# Patient Record
Sex: MALE | Race: WHITE | NOT HISPANIC OR LATINO
[De-identification: names, ages, dates, MRNs, and addresses within clinical notes are randomized per-mention and may not be internally consistent; named-entity substitution may affect disease eponyms.]

---

## 2020-12-22 ENCOUNTER — APPOINTMENT (OUTPATIENT)
Dept: NEUROSURGERY | Facility: CLINIC | Age: 70
End: 2020-12-22

## 2021-01-12 ENCOUNTER — APPOINTMENT (OUTPATIENT)
Dept: NEUROSURGERY | Facility: CLINIC | Age: 71
End: 2021-01-12
Payer: MEDICARE

## 2021-01-12 VITALS
SYSTOLIC BLOOD PRESSURE: 117 MMHG | WEIGHT: 135 LBS | RESPIRATION RATE: 15 BRPM | HEIGHT: 69 IN | OXYGEN SATURATION: 99 % | BODY MASS INDEX: 19.99 KG/M2 | DIASTOLIC BLOOD PRESSURE: 73 MMHG | HEART RATE: 59 BPM

## 2021-01-12 VITALS — TEMPERATURE: 97.2 F

## 2021-01-12 DIAGNOSIS — Z78.9 OTHER SPECIFIED HEALTH STATUS: ICD-10-CM

## 2021-01-12 PROCEDURE — 99072 ADDL SUPL MATRL&STAF TM PHE: CPT

## 2021-01-12 PROCEDURE — 99203 OFFICE O/P NEW LOW 30 MIN: CPT

## 2021-01-14 PROBLEM — Z78.9 CONSUMES ALCOHOL OCCASIONALLY: Status: ACTIVE | Noted: 2021-01-12

## 2021-01-14 RX ORDER — CHROMIUM 200 MCG
TABLET ORAL
Refills: 0 | Status: ACTIVE | COMMUNITY

## 2021-01-18 NOTE — REASON FOR VISIT
[Consultation] : a consultation visit [Referred By: _________] : Patient was referred by MARIELLE [FreeTextEntry1] : low back pain

## 2021-01-18 NOTE — REVIEW OF SYSTEMS
[As Noted in HPI] : as noted in HPI [Negative] : Endocrine [Abdominal Pain] : no abdominal pain [Vomiting] : no vomiting [Skin Lesions] : no skin lesions [Diarrhea] : no diarrhea [Easy Bleeding] : no tendency for easy bleeding [Easy Bruising] : no tendency for easy bruising [FreeTextEntry9] : denies at this time

## 2021-01-18 NOTE — PHYSICAL EXAM
[General Appearance - Alert] : alert [General Appearance - In No Acute Distress] : in no acute distress [Oriented To Time, Place, And Person] : oriented to person, place, and time [Impaired Insight] : insight and judgment were intact [Cranial Nerves Optic (II)] : visual acuity intact bilaterally,  pupils equal round and reactive to light [Cranial Nerves Oculomotor (III)] : extraocular motion intact [Cranial Nerves Trigeminal (V)] : facial sensation intact symmetrically [Cranial Nerves Facial (VII)] : face symmetrical [Cranial Nerves Vestibulocochlear (VIII)] : hearing was intact bilaterally [Normal] : normal [Able to toe walk] : the patient was able to toe walk [Able to heel walk] : the patient was able to heel walk [Sclera] : the sclera and conjunctiva were normal [PERRL With Normal Accommodation] : pupils were equal in size, round, reactive to light, with normal accommodation [Hearing Threshold Finger Rub Not [place]] : hearing was normal [Neck Appearance] : the appearance of the neck was normal [] : no respiratory distress [Edema] : there was no peripheral edema [Respiration, Rhythm And Depth] : normal respiratory rhythm and effort [Abnormal Walk] : normal gait [Involuntary Movements] : no involuntary movements were seen [Skin Color & Pigmentation] : normal skin color and pigmentation [Motor Tone] : muscle strength and tone were normal [Romberg's Sign] : Romberg's sign was negtive [Limited Balance] : balance was intact [Tremor] : no tremor present

## 2021-01-18 NOTE — HISTORY OF PRESENT ILLNESS
[> 3 months] : more  than 3 months [FreeTextEntry1] : low back pain [de-identified] : 71 yo male with PMH of B cell lymphoma, gastric Ca, Prostate Ca, who arrives for an initial consultation for low back pain and RLE pain with onset of 10/10/2020.  He was found to have a large L5/S1 herniated disc on MRI sent by Dr. Mejia, and has tried pain management without successful reduction of pain.  He reports that he does not have any pain over the last 2 weeks.  He reports having ESCI in November with no relief at that time.   \par \par Pain:  \par Low back - denies back pain (mostly RLE pain)\par RLE from hip to toes\par Pain rated 10/10\par Aggravated with prolonged sitting, standing, walking\par Pain management:  Dr. Davis - has included 2 REMINGTON, and PT with no relief\par Pain medications:  currently OTC anti inflammatory

## 2021-01-18 NOTE — ASSESSMENT
[FreeTextEntry1] : 71 yo male referred by pain management for L5 radiculopathy on MRI, and refractory to injections and PT which has since resolved over the last 2 weeks.  He will continue pain management and follow up as needed if worsens again.\par \par 1)  f/u PRN

## 2022-03-08 ENCOUNTER — APPOINTMENT (OUTPATIENT)
Dept: NEUROSURGERY | Facility: CLINIC | Age: 72
End: 2022-03-08
Payer: MEDICARE

## 2022-03-08 VITALS
SYSTOLIC BLOOD PRESSURE: 134 MMHG | WEIGHT: 135 LBS | HEIGHT: 69 IN | BODY MASS INDEX: 19.99 KG/M2 | DIASTOLIC BLOOD PRESSURE: 84 MMHG | TEMPERATURE: 98 F | HEART RATE: 59 BPM | OXYGEN SATURATION: 97 %

## 2022-03-08 DIAGNOSIS — Z87.891 PERSONAL HISTORY OF NICOTINE DEPENDENCE: ICD-10-CM

## 2022-03-08 PROCEDURE — 99214 OFFICE O/P EST MOD 30 MIN: CPT

## 2022-03-10 PROBLEM — Z87.891 FORMER SMOKER: Status: ACTIVE | Noted: 2021-01-12

## 2022-03-14 ENCOUNTER — OUTPATIENT (OUTPATIENT)
Dept: OUTPATIENT SERVICES | Facility: HOSPITAL | Age: 72
LOS: 1 days | End: 2022-03-14
Payer: MEDICARE

## 2022-03-14 VITALS
HEART RATE: 56 BPM | SYSTOLIC BLOOD PRESSURE: 130 MMHG | HEIGHT: 69 IN | DIASTOLIC BLOOD PRESSURE: 75 MMHG | OXYGEN SATURATION: 98 % | WEIGHT: 134.92 LBS | RESPIRATION RATE: 18 BRPM | TEMPERATURE: 96 F

## 2022-03-14 DIAGNOSIS — Z29.9 ENCOUNTER FOR PROPHYLACTIC MEASURES, UNSPECIFIED: ICD-10-CM

## 2022-03-14 DIAGNOSIS — Z90.49 ACQUIRED ABSENCE OF OTHER SPECIFIED PARTS OF DIGESTIVE TRACT: Chronic | ICD-10-CM

## 2022-03-14 DIAGNOSIS — Z98.890 OTHER SPECIFIED POSTPROCEDURAL STATES: Chronic | ICD-10-CM

## 2022-03-14 DIAGNOSIS — Z90.3 ACQUIRED ABSENCE OF STOMACH [PART OF]: Chronic | ICD-10-CM

## 2022-03-14 DIAGNOSIS — Z92.3 PERSONAL HISTORY OF IRRADIATION: Chronic | ICD-10-CM

## 2022-03-14 DIAGNOSIS — Z11.52 ENCOUNTER FOR SCREENING FOR COVID-19: ICD-10-CM

## 2022-03-14 DIAGNOSIS — M51.26 OTHER INTERVERTEBRAL DISC DISPLACEMENT, LUMBAR REGION: ICD-10-CM

## 2022-03-14 LAB
A1C WITH ESTIMATED AVERAGE GLUCOSE RESULT: 5.7 % — HIGH (ref 4–5.6)
ANION GAP SERPL CALC-SCNC: 11 MMOL/L — SIGNIFICANT CHANGE UP (ref 5–17)
BUN SERPL-MCNC: 16 MG/DL — SIGNIFICANT CHANGE UP (ref 7–23)
CALCIUM SERPL-MCNC: 9.7 MG/DL — SIGNIFICANT CHANGE UP (ref 8.4–10.5)
CHLORIDE SERPL-SCNC: 102 MMOL/L — SIGNIFICANT CHANGE UP (ref 96–108)
CO2 SERPL-SCNC: 25 MMOL/L — SIGNIFICANT CHANGE UP (ref 22–31)
CREAT SERPL-MCNC: 0.91 MG/DL — SIGNIFICANT CHANGE UP (ref 0.5–1.3)
EGFR: 90 ML/MIN/1.73M2 — SIGNIFICANT CHANGE UP
ESTIMATED AVERAGE GLUCOSE: 117 MG/DL — HIGH (ref 68–114)
GLUCOSE SERPL-MCNC: 80 MG/DL — SIGNIFICANT CHANGE UP (ref 70–99)
HCT VFR BLD CALC: 40.7 % — SIGNIFICANT CHANGE UP (ref 39–50)
HGB BLD-MCNC: 12.4 G/DL — LOW (ref 13–17)
MCHC RBC-ENTMCNC: 29 PG — SIGNIFICANT CHANGE UP (ref 27–34)
MCHC RBC-ENTMCNC: 30.5 GM/DL — LOW (ref 32–36)
MCV RBC AUTO: 95.1 FL — SIGNIFICANT CHANGE UP (ref 80–100)
NRBC # BLD: 0 /100 WBCS — SIGNIFICANT CHANGE UP (ref 0–0)
PLATELET # BLD AUTO: 247 K/UL — SIGNIFICANT CHANGE UP (ref 150–400)
POTASSIUM SERPL-MCNC: 4.1 MMOL/L — SIGNIFICANT CHANGE UP (ref 3.5–5.3)
POTASSIUM SERPL-SCNC: 4.1 MMOL/L — SIGNIFICANT CHANGE UP (ref 3.5–5.3)
RBC # BLD: 4.28 M/UL — SIGNIFICANT CHANGE UP (ref 4.2–5.8)
RBC # FLD: 13.1 % — SIGNIFICANT CHANGE UP (ref 10.3–14.5)
SARS-COV-2 RNA SPEC QL NAA+PROBE: SIGNIFICANT CHANGE UP
SODIUM SERPL-SCNC: 138 MMOL/L — SIGNIFICANT CHANGE UP (ref 135–145)
WBC # BLD: 17.21 K/UL — HIGH (ref 3.8–10.5)
WBC # FLD AUTO: 17.21 K/UL — HIGH (ref 3.8–10.5)

## 2022-03-14 PROCEDURE — U0003: CPT

## 2022-03-14 PROCEDURE — 87640 STAPH A DNA AMP PROBE: CPT

## 2022-03-14 PROCEDURE — 83036 HEMOGLOBIN GLYCOSYLATED A1C: CPT

## 2022-03-14 PROCEDURE — C9803: CPT

## 2022-03-14 PROCEDURE — U0005: CPT

## 2022-03-14 PROCEDURE — G0463: CPT

## 2022-03-14 PROCEDURE — 85027 COMPLETE CBC AUTOMATED: CPT

## 2022-03-14 PROCEDURE — 36415 COLL VENOUS BLD VENIPUNCTURE: CPT

## 2022-03-14 PROCEDURE — 87641 MR-STAPH DNA AMP PROBE: CPT

## 2022-03-14 PROCEDURE — 80048 BASIC METABOLIC PNL TOTAL CA: CPT

## 2022-03-14 NOTE — H&P PST ADULT - NSICDXPASTSURGICALHX_GEN_ALL_CORE_FT
PAST SURGICAL HISTORY:  H/O resection of stomach ~1999, [facility]    History of appendectomy     History of bilateral inguinal hernia repair     History of bronchoscopy     History of radiation therapy prostate cancer ~2007

## 2022-03-14 NOTE — H&P PST ADULT - NSICDXPASTMEDICALHX_GEN_ALL_CORE_FT
PAST MEDICAL HISTORY:  2019 novel coronavirus disease (COVID-19)     BPH with elevated PSA     H/O abscess of lung ~2012 Hospitalized x 3 weeks, ~2013 Hospitalized x 2 weeks - never intubated; bronchoscopy and antibiotics    Prediabetes     Prostate cancer     Stomach cancer      PAST MEDICAL HISTORY:  2019 novel coronavirus disease (COVID-19)     BPH with elevated PSA     H/O abscess of lung ~2012 Hospitalized x 3 weeks, ~2013 Hospitalized x 2 weeks - never intubated; bronchoscopy and antibiotics    Lumbar herniated disc     Prediabetes     Prostate cancer     Stomach cancer

## 2022-03-14 NOTE — H&P PST ADULT - ASSESSMENT
CAPRINI SCORE [CLOT]    AGE RELATED RISK FACTORS                                                       MOBILITY RELATED FACTORS  [ ] Age 41-60 years                                            (1 Point)                  [ ] Bed rest                                                        (1 Point)  [X ] Age: 61-74 years                                           (2 Points)                 [ ] Plaster cast                                                   (2 Points)  [ ] Age= 75 years                                              (3 Points)                 [ ] Bed bound for more than 72 hours                 (2 Points)    DISEASE RELATED RISK FACTORS                                               GENDER SPECIFIC FACTORS  [ ] Edema in the lower extremities                       (1 Point)                  [ ] Pregnancy                                                     (1 Point)  [ ] Varicose veins                                               (1 Point)                  [ ] Post-partum < 6 weeks                                   (1 Point)             [ ] BMI > 25 Kg/m2                                            (1 Point)                  [ ] Hormonal therapy  or oral contraception          (1 Point)                 [ ] Sepsis (in the previous month)                        (1 Point)                  [ ] History of pregnancy complications                 (1 point)  [ ] Pneumonia or serious lung disease                                               [ ] Unexplained or recurrent                     (1 Point)           (in the previous month)                               (1 Point)  [ ] Abnormal pulmonary function test                     (1 Point)                 SURGERY RELATED RISK FACTORS  [ ] Acute myocardial infarction                              (1 Point)                 [ ]  Section                                             (1 Point)  [ ] Congestive heart failure (in the previous month)  (1 Point)               [ ] Minor surgery                                                  (1 Point)   [ ] Inflammatory bowel disease                             (1 Point)                 [ ] Arthroscopic surgery                                        (2 Points)  [ ] Central venous access                                      (2 Points)                [ X] General surgery lasting more than 45 minutes   (2 Points)       [ ] Stroke (in the previous month)                          (5 Points)               [ ] Elective arthroplasty                                         (5 Points)                                                                                                                                               HEMATOLOGY RELATED FACTORS                                                 TRAUMA RELATED RISK FACTORS  [ ] Prior episodes of VTE                                     (3 Points)                [ ] Fracture of the hip, pelvis, or leg                       (5 Points)  [ ] Positive family history for VTE                         (3 Points)                 [ ] Acute spinal cord injury (in the previous month)  (5 Points)  [ ] Prothrombin 53534 A                                     (3 Points)                 [ ] Paralysis  (less than 1 month)                             (5 Points)  [ ] Factor V Leiden                                             (3 Points)                  [ ] Multiple Trauma within 1 month                        (5 Points)  [ ] Lupus anticoagulants                                     (3 Points)                                                           [ ] Anticardiolipin antibodies                               (3 Points)                                                       [ ] High homocysteine in the blood                      (3 Points)                                             [ ] Other congenital or acquired thrombophilia      (3 Points)                                                [ ] Heparin induced thrombocytopenia                  (3 Points)                                          Total Score [          ]    Caprini Score 0 - 2:  Low Risk, No VTE Prophylaxis required for most patients, encourage ambulation  Caprini Score 3 - 6:  At Risk, pharmacologic VTE prophylaxis is indicated for most patients (in the absence of a contraindication)  Caprini Score Greater than or = 7:  High Risk, pharmacologic VTE prophylaxis is indicated for most patients (in the absence of a contraindication)

## 2022-03-14 NOTE — H&P PST ADULT - NSANTHOSAYNRD_GEN_A_CORE
No. LATONIA screening performed.  STOP BANG Legend: 0-2 = LOW Risk; 3-4 = INTERMEDIATE Risk; 5-8 = HIGH Risk

## 2022-03-14 NOTE — H&P PST ADULT - PROBLEM SELECTOR PLAN 1
MIS Right L5-S1 Partial Hemilaminectomy and Discectomy on 3/18/22 with Dr. Xavier Dinh at [facility].  Pre-op instructions given. Questions answered.  Covid19 PCR to be performed at [facility] today.  Medical evaluation prior to surgery.

## 2022-03-15 ENCOUNTER — APPOINTMENT (OUTPATIENT)
Dept: MRI IMAGING | Facility: CLINIC | Age: 72
End: 2022-03-15
Payer: MEDICARE

## 2022-03-15 LAB
MRSA PCR RESULT.: SIGNIFICANT CHANGE UP
S AUREUS DNA NOSE QL NAA+PROBE: SIGNIFICANT CHANGE UP

## 2022-03-15 PROCEDURE — 72148 MRI LUMBAR SPINE W/O DYE: CPT

## 2022-03-16 ENCOUNTER — APPOINTMENT (OUTPATIENT)
Dept: UROLOGY | Facility: CLINIC | Age: 72
End: 2022-03-16

## 2022-03-17 ENCOUNTER — TRANSCRIPTION ENCOUNTER (OUTPATIENT)
Age: 72
End: 2022-03-17

## 2022-03-17 VITALS
HEART RATE: 60 BPM | DIASTOLIC BLOOD PRESSURE: 81 MMHG | SYSTOLIC BLOOD PRESSURE: 131 MMHG | HEIGHT: 69 IN | TEMPERATURE: 98 F | WEIGHT: 130.07 LBS | OXYGEN SATURATION: 100 % | RESPIRATION RATE: 18 BRPM

## 2022-03-17 RX ORDER — INFLUENZA VIRUS VACCINE 15; 15; 15; 15 UG/.5ML; UG/.5ML; UG/.5ML; UG/.5ML
0.7 SUSPENSION INTRAMUSCULAR ONCE
Refills: 0 | Status: DISCONTINUED | OUTPATIENT
Start: 2022-03-18 | End: 2022-03-18

## 2022-03-17 RX ORDER — ACETAMINOPHEN 500 MG
2 TABLET ORAL
Qty: 0 | Refills: 0 | DISCHARGE

## 2022-03-18 ENCOUNTER — RESULT REVIEW (OUTPATIENT)
Age: 72
End: 2022-03-18

## 2022-03-18 ENCOUNTER — INPATIENT (INPATIENT)
Facility: HOSPITAL | Age: 72
LOS: 0 days | Discharge: HOME | DRG: 519 | End: 2022-03-18
Attending: STUDENT IN AN ORGANIZED HEALTH CARE EDUCATION/TRAINING PROGRAM | Admitting: STUDENT IN AN ORGANIZED HEALTH CARE EDUCATION/TRAINING PROGRAM
Payer: MEDICARE

## 2022-03-18 ENCOUNTER — TRANSCRIPTION ENCOUNTER (OUTPATIENT)
Age: 72
End: 2022-03-18

## 2022-03-18 ENCOUNTER — APPOINTMENT (OUTPATIENT)
Dept: NEUROSURGERY | Facility: HOSPITAL | Age: 72
End: 2022-03-18

## 2022-03-18 VITALS
OXYGEN SATURATION: 98 % | HEART RATE: 50 BPM | TEMPERATURE: 97 F | SYSTOLIC BLOOD PRESSURE: 145 MMHG | RESPIRATION RATE: 14 BRPM | DIASTOLIC BLOOD PRESSURE: 79 MMHG

## 2022-03-18 DIAGNOSIS — Z98.890 OTHER SPECIFIED POSTPROCEDURAL STATES: Chronic | ICD-10-CM

## 2022-03-18 DIAGNOSIS — R73.03 PREDIABETES: ICD-10-CM

## 2022-03-18 DIAGNOSIS — Z85.028 PERSONAL HISTORY OF OTHER MALIGNANT NEOPLASM OF STOMACH: ICD-10-CM

## 2022-03-18 DIAGNOSIS — Z87.891 PERSONAL HISTORY OF NICOTINE DEPENDENCE: ICD-10-CM

## 2022-03-18 DIAGNOSIS — N40.0 BENIGN PROSTATIC HYPERPLASIA WITHOUT LOWER URINARY TRACT SYMPTOMS: ICD-10-CM

## 2022-03-18 DIAGNOSIS — C85.10 UNSPECIFIED B-CELL LYMPHOMA, UNSPECIFIED SITE: ICD-10-CM

## 2022-03-18 DIAGNOSIS — Z92.3 PERSONAL HISTORY OF IRRADIATION: ICD-10-CM

## 2022-03-18 DIAGNOSIS — M51.17 INTERVERTEBRAL DISC DISORDERS WITH RADICULOPATHY, LUMBOSACRAL REGION: ICD-10-CM

## 2022-03-18 DIAGNOSIS — Z90.49 ACQUIRED ABSENCE OF OTHER SPECIFIED PARTS OF DIGESTIVE TRACT: Chronic | ICD-10-CM

## 2022-03-18 DIAGNOSIS — Z92.3 PERSONAL HISTORY OF IRRADIATION: Chronic | ICD-10-CM

## 2022-03-18 DIAGNOSIS — Z86.16 PERSONAL HISTORY OF COVID-19: ICD-10-CM

## 2022-03-18 DIAGNOSIS — Z85.46 PERSONAL HISTORY OF MALIGNANT NEOPLASM OF PROSTATE: ICD-10-CM

## 2022-03-18 DIAGNOSIS — Z90.3 ACQUIRED ABSENCE OF STOMACH [PART OF]: Chronic | ICD-10-CM

## 2022-03-18 LAB
BLD GP AB SCN SERPL QL: NEGATIVE — SIGNIFICANT CHANGE UP
GLUCOSE BLDC GLUCOMTR-MCNC: 86 MG/DL — SIGNIFICANT CHANGE UP (ref 70–99)
RH IG SCN BLD-IMP: POSITIVE — SIGNIFICANT CHANGE UP

## 2022-03-18 PROCEDURE — 86901 BLOOD TYPING SEROLOGIC RH(D): CPT

## 2022-03-18 PROCEDURE — 86900 BLOOD TYPING SEROLOGIC ABO: CPT

## 2022-03-18 PROCEDURE — 88304 TISSUE EXAM BY PATHOLOGIST: CPT

## 2022-03-18 PROCEDURE — 88304 TISSUE EXAM BY PATHOLOGIST: CPT | Mod: 26

## 2022-03-18 PROCEDURE — 76000 FLUOROSCOPY <1 HR PHYS/QHP: CPT

## 2022-03-18 PROCEDURE — 82962 GLUCOSE BLOOD TEST: CPT

## 2022-03-18 PROCEDURE — 63030 LAMOT DCMPRN NRV RT 1 LMBR: CPT | Mod: 80,RT

## 2022-03-18 PROCEDURE — 63030 LAMOT DCMPRN NRV RT 1 LMBR: CPT | Mod: RT

## 2022-03-18 PROCEDURE — 86850 RBC ANTIBODY SCREEN: CPT

## 2022-03-18 RX ORDER — CHLORHEXIDINE GLUCONATE 213 G/1000ML
1 SOLUTION TOPICAL ONCE
Refills: 0 | Status: COMPLETED | OUTPATIENT
Start: 2022-03-18 | End: 2022-03-18

## 2022-03-18 RX ORDER — ACETAMINOPHEN 500 MG
1000 TABLET ORAL ONCE
Refills: 0 | Status: COMPLETED | OUTPATIENT
Start: 2022-03-18 | End: 2022-03-18

## 2022-03-18 RX ORDER — TIZANIDINE 4 MG/1
2 TABLET ORAL
Qty: 0 | Refills: 0 | DISCHARGE

## 2022-03-18 RX ORDER — ACETAMINOPHEN 500 MG
2 TABLET ORAL
Qty: 0 | Refills: 0 | DISCHARGE

## 2022-03-18 RX ORDER — CELECOXIB 200 MG/1
200 CAPSULE ORAL ONCE
Refills: 0 | Status: COMPLETED | OUTPATIENT
Start: 2022-03-18 | End: 2022-03-18

## 2022-03-18 RX ORDER — AZTREONAM 2 G
1 VIAL (EA) INJECTION
Qty: 6 | Refills: 0
Start: 2022-03-18 | End: 2022-03-20

## 2022-03-18 RX ORDER — BUPIVACAINE 13.3 MG/ML
20 INJECTION, SUSPENSION, LIPOSOMAL INFILTRATION ONCE
Refills: 0 | Status: DISCONTINUED | OUTPATIENT
Start: 2022-03-18 | End: 2022-03-18

## 2022-03-18 RX ORDER — POVIDONE-IODINE 5 %
1 AEROSOL (ML) TOPICAL ONCE
Refills: 0 | Status: COMPLETED | OUTPATIENT
Start: 2022-03-18 | End: 2022-03-18

## 2022-03-18 RX ORDER — APREPITANT 80 MG/1
40 CAPSULE ORAL ONCE
Refills: 0 | Status: COMPLETED | OUTPATIENT
Start: 2022-03-18 | End: 2022-03-18

## 2022-03-18 RX ORDER — IBUPROFEN 200 MG
1 TABLET ORAL
Qty: 0 | Refills: 0 | DISCHARGE

## 2022-03-18 RX ORDER — GABAPENTIN 400 MG/1
1 CAPSULE ORAL
Qty: 0 | Refills: 0 | DISCHARGE

## 2022-03-18 RX ORDER — CHOLECALCIFEROL (VITAMIN D3) 125 MCG
1 CAPSULE ORAL
Qty: 0 | Refills: 0 | DISCHARGE

## 2022-03-18 RX ADMIN — CELECOXIB 200 MILLIGRAM(S): 200 CAPSULE ORAL at 13:35

## 2022-03-18 RX ADMIN — APREPITANT 40 MILLIGRAM(S): 80 CAPSULE ORAL at 13:35

## 2022-03-18 RX ADMIN — Medication 1000 MILLIGRAM(S): at 13:35

## 2022-03-18 RX ADMIN — CELECOXIB 200 MILLIGRAM(S): 200 CAPSULE ORAL at 13:37

## 2022-03-18 RX ADMIN — Medication 1 APPLICATION(S): at 13:32

## 2022-03-18 RX ADMIN — Medication 1000 MILLIGRAM(S): at 13:37

## 2022-03-18 RX ADMIN — CHLORHEXIDINE GLUCONATE 1 APPLICATION(S): 213 SOLUTION TOPICAL at 13:32

## 2022-03-18 NOTE — PACU DISCHARGE NOTE - COMMENTS
Pt has met PACU criteria for discharge home. Pt verbalizes understanding of discharge instructions. Signed copy to pt and one in the chart.

## 2022-03-18 NOTE — ASU DISCHARGE PLAN (ADULT/PEDIATRIC) - CARE PROVIDER_API CALL
Xavier Dinh)  Neurosurgery  General  [address] [place] [address], [address]  [place] [address]  Phone: [phone]  Fax: [phone]  Established Patient  Follow Up Time: 2 weeks

## 2022-03-18 NOTE — DISCHARGE NOTE NURSING/CASE MANAGEMENT/SOCIAL WORK - NSDCPEFALRISK_GEN_ALL_CORE
For information on Fall & Injury Prevention, visit: [email or web address] OR  [email or web address] OR  https://www.cdc.gov/steadi/patient.html

## 2022-03-18 NOTE — ASU DISCHARGE PLAN (ADULT/PEDIATRIC) - ASU DC SPECIAL INSTRUCTIONSFT
You may remove surgical dressing on post-operative day #2 (Sunday). At that time you may shower, however avoid submerging the surgical incision in water. When drying, pat the incision dry and avoid excessive rubbing.

## 2022-03-18 NOTE — ASU DISCHARGE PLAN (ADULT/PEDIATRIC) - NS MD DC FALL RISK RISK
For information on Fall & Injury Prevention, visit: [email or web address] OR  [email or web address] OR  https://www.cdc.gov/steadi/patient.html

## 2022-03-18 NOTE — DISCHARGE NOTE NURSING/CASE MANAGEMENT/SOCIAL WORK - PATIENT PORTAL LINK FT
You can access the FollowMyHealth Patient Portal offered by [facility] by registering at the following website: [email or web address]. By joining [facility]’s FollowMyHealth portal, you will also be able to view your health information using other applications (apps) compatible with our system.

## 2022-03-21 PROBLEM — C91.90 LYMPHOID LEUKEMIA, UNSPECIFIED NOT HAVING ACHIEVED REMISSION: Chronic | Status: ACTIVE | Noted: 2022-03-17

## 2022-03-21 PROBLEM — N40.0 BENIGN PROSTATIC HYPERPLASIA WITHOUT LOWER URINARY TRACT SYMPTOMS: Chronic | Status: ACTIVE | Noted: 2022-03-14

## 2022-03-21 PROBLEM — C16.9 MALIGNANT NEOPLASM OF STOMACH, UNSPECIFIED: Chronic | Status: ACTIVE | Noted: 2022-03-14

## 2022-03-21 PROBLEM — C61 MALIGNANT NEOPLASM OF PROSTATE: Chronic | Status: ACTIVE | Noted: 2022-03-14

## 2022-03-21 PROBLEM — R73.03 PREDIABETES: Chronic | Status: ACTIVE | Noted: 2022-03-14

## 2022-03-21 PROBLEM — M51.26 OTHER INTERVERTEBRAL DISC DISPLACEMENT, LUMBAR REGION: Chronic | Status: ACTIVE | Noted: 2022-03-14

## 2022-03-21 PROBLEM — Z87.09 PERSONAL HISTORY OF OTHER DISEASES OF THE RESPIRATORY SYSTEM: Chronic | Status: ACTIVE | Noted: 2022-03-14

## 2022-03-21 PROBLEM — U07.1 COVID-19: Chronic | Status: ACTIVE | Noted: 2022-03-14

## 2022-03-23 NOTE — PHYSICAL EXAM
[General Appearance - Alert] : alert [General Appearance - In No Acute Distress] : in no acute distress [Oriented To Time, Place, And Person] : oriented to person, place, and time [Impaired Insight] : insight and judgment were intact [Cranial Nerves Optic (II)] : visual acuity intact bilaterally,  pupils equal round and reactive to light [Cranial Nerves Oculomotor (III)] : extraocular motion intact [Cranial Nerves Trigeminal (V)] : facial sensation intact symmetrically [Cranial Nerves Facial (VII)] : face symmetrical [Cranial Nerves Vestibulocochlear (VIII)] : hearing was intact bilaterally [Normal] : normal [Able to toe walk] : the patient was able to toe walk [Able to heel walk] : the patient was able to heel walk [Sclera] : the sclera and conjunctiva were normal [PERRL With Normal Accommodation] : pupils were equal in size, round, reactive to light, with normal accommodation [Hearing Threshold Finger Rub Not [place]] : hearing was normal [Neck Appearance] : the appearance of the neck was normal [] : no respiratory distress [Respiration, Rhythm And Depth] : normal respiratory rhythm and effort [Edema] : there was no peripheral edema [Abnormal Walk] : normal gait [Involuntary Movements] : no involuntary movements were seen [Motor Tone] : muscle strength and tone were normal [Skin Color & Pigmentation] : normal skin color and pigmentation [Romberg's Sign] : Romberg's sign was negtive [Limited Balance] : balance was intact [Tremor] : no tremor present

## 2022-03-23 NOTE — ASSESSMENT
[FreeTextEntry1] : 71 yo male referred by pain management for L5 radiculopathy.  [facility] MRI shows a large herniated L5/S1 disc from 11/2020.  He is suffering in severe pain, unable to function without severe pain.  I discussed surgical intervention to alleviate the pressure and reduce his pain.  After discussion of the risks, benefits, and alternatives to surgery, he is eager to proceed.\par \par  I will obtain an updated MRI asap, and schedule surgery.\par \par 1)  MRI Lumbar wo - asap\par 2)  Schedule Lumbar surgery

## 2022-03-23 NOTE — REVIEW OF SYSTEMS
[As Noted in HPI] : as noted in HPI [Negative] : Endocrine [Abdominal Pain] : no abdominal pain [Vomiting] : no vomiting [Diarrhea] : no diarrhea [Skin Lesions] : no skin lesions [Easy Bleeding] : no tendency for easy bleeding [Easy Bruising] : no tendency for easy bruising [FreeTextEntry9] : denies at this time

## 2022-03-23 NOTE — HISTORY OF PRESENT ILLNESS
[FreeTextEntry1] : 72 yo male with PMH of B cell lymphoma, gastric Ca, Prostate Ca, chronic low back pain and RLE pain with onset of 10/10/2020.  He was found to have a large L5/S1 herniated disc on MRI sent by Dr. Mejia.  Pain management  with Dr. Saha, including injections, did not provide relief - see below\par \par 1/12/2021:  He reports that he does not have any pain over the last 2 weeks.  He reports having ESCI in November with no relief at that time.   \par \par Pain:  \par Low back - denies back pain (mostly RLE pain)\par RLE from hip to toes\par Pain rated 10/10\par Aggravated with prolonged sitting, standing, walking\par Pain management:  Dr. Davis - has included 2 REMINGTON, and PT with no relief\par Pain management:  Dr. Saha has included injections with no relief\par Pain medications:  currently Gabapentin 300 mg 1 tab TID, Motrin, Tylenol, and a muscle relaxer \par \par

## 2022-03-29 ENCOUNTER — APPOINTMENT (OUTPATIENT)
Dept: NEUROSURGERY | Facility: CLINIC | Age: 72
End: 2022-03-29
Payer: MEDICARE

## 2022-03-29 VITALS
WEIGHT: 135 LBS | HEIGHT: 69 IN | OXYGEN SATURATION: 98 % | SYSTOLIC BLOOD PRESSURE: 129 MMHG | BODY MASS INDEX: 19.99 KG/M2 | DIASTOLIC BLOOD PRESSURE: 79 MMHG | HEART RATE: 60 BPM

## 2022-03-29 DIAGNOSIS — M54.16 RADICULOPATHY, LUMBAR REGION: ICD-10-CM

## 2022-03-29 PROCEDURE — 99024 POSTOP FOLLOW-UP VISIT: CPT

## 2022-03-29 RX ORDER — ANTIPYRINE AND BENZOCAINE 54; 14 MG/ML; MG/ML
SOLUTION AURICULAR (OTIC)
Refills: 0 | Status: DISCONTINUED | COMMUNITY
End: 2022-03-29

## 2022-03-30 PROBLEM — M54.16 LUMBAR RADICULOPATHY, RIGHT: Status: ACTIVE | Noted: 2021-01-14

## 2022-04-02 NOTE — HISTORY OF PRESENT ILLNESS
[FreeTextEntry1] : 72 yo male with PMH of B cell lymphoma, gastric Ca, Prostate Ca, chronic low back pain and RLE pain with onset of 10/10/2020.  He was found to have a large L5/S1 herniated disc on MRI sent by Dr. Mejia.  Pain management  with Dr. Saha, including injections, did not provide relief - see below\par \par Today, he arrives for an initial post op visit s/p 3/18/22 L5-S1 microdiskectomy.  He is feeling well, denies pain, and is very pleased.  The surgical incision is well healed, no signs of irritation, erythema, edema, warmth, or drainage.\par \par 1/12/2021:  He reports that he does not have any pain over the last 2 weeks.  He reports having ESCI in November with no relief at that time.   \par \par Pain:  \par Low back - denies back pain (mostly RLE pain)\par RLE from hip to toes\par Pain rated 10/10\par Aggravated with prolonged sitting, standing, walking\par Pain management:  Dr. Davis - has included 2 REMINGTON, and PT with no relief\par Pain management:  Dr. Saha has included injections with no relief\par Pain medications:  currently Gabapentin 300 mg 1 tab TID, Motrin, Tylenol, and a muscle relaxer \par \par

## 2022-04-02 NOTE — PHYSICAL EXAM
[General Appearance - Alert] : alert [General Appearance - In No Acute Distress] : in no acute distress [Clean] : clean [Dry] : dry [Well-Healed] : well-healed [Intact] : intact [No Drainage] : without drainage [Normal Skin] : normal [Oriented To Time, Place, And Person] : oriented to person, place, and time [Impaired Insight] : insight and judgment were intact [5] : S1 toe walking 5/5 [Normal] : normal [Able to toe walk] : the patient was able to toe walk [Able to heel walk] : the patient was able to heel walk [Sclera] : the sclera and conjunctiva were normal [PERRL With Normal Accommodation] : pupils were equal in size, round, reactive to light, with normal accommodation [Hearing Threshold Finger Rub Not [place]] : hearing was normal [Neck Appearance] : the appearance of the neck was normal [] : no respiratory distress [Respiration, Rhythm And Depth] : normal respiratory rhythm and effort [Edema] : there was no peripheral edema [Abnormal Walk] : normal gait [Involuntary Movements] : no involuntary movements were seen [Motor Tone] : muscle strength and tone were normal [Skin Color & Pigmentation] : normal skin color and pigmentation [Erythema] : not erythematous [Warm] : not warm [Romberg's Sign] : Romberg's sign was negtive [Limited Balance] : balance was intact [Tremor] : no tremor present

## 2022-04-02 NOTE — ASSESSMENT
[FreeTextEntry1] : 70 yo male s/p 3/18/22 L5-S1 microdiscectomy who has resolution of pre op severe pain and is very pleased.  The surgical incision is well healed with no signs of irritation, erythema, edema, or drainage.  Reviewed standard post op wound care instructions and light activity restrictions.  \par \par 1)  RTO 6 months - no imaging requested

## 2022-04-05 LAB — SURGICAL PATHOLOGY STUDY: SIGNIFICANT CHANGE UP

## 2022-04-14 ENCOUNTER — LABORATORY RESULT (OUTPATIENT)
Age: 72
End: 2022-04-14

## 2022-04-14 ENCOUNTER — APPOINTMENT (OUTPATIENT)
Dept: UROLOGY | Facility: CLINIC | Age: 72
End: 2022-04-14
Payer: MEDICARE

## 2022-04-14 VITALS
HEART RATE: 50 BPM | HEIGHT: 69 IN | DIASTOLIC BLOOD PRESSURE: 70 MMHG | BODY MASS INDEX: 18.07 KG/M2 | WEIGHT: 122 LBS | TEMPERATURE: 97.2 F | OXYGEN SATURATION: 99 % | SYSTOLIC BLOOD PRESSURE: 106 MMHG

## 2022-04-14 DIAGNOSIS — Z85.46 PERSONAL HISTORY OF MALIGNANT NEOPLASM OF PROSTATE: ICD-10-CM

## 2022-04-14 DIAGNOSIS — Z85.028 PERSONAL HISTORY OF OTHER MALIGNANT NEOPLASM OF STOMACH: ICD-10-CM

## 2022-04-14 DIAGNOSIS — Z63.5 DISRUPTION OF FAMILY BY SEPARATION AND DIVORCE: ICD-10-CM

## 2022-04-14 DIAGNOSIS — Z87.09 PERSONAL HISTORY OF OTHER DISEASES OF THE RESPIRATORY SYSTEM: ICD-10-CM

## 2022-04-14 PROCEDURE — 99204 OFFICE O/P NEW MOD 45 MIN: CPT

## 2022-04-14 SDOH — SOCIAL STABILITY - SOCIAL INSECURITY: DISRUPTION OF FAMILY BY SEPARATION AND DIVORCE: Z63.5

## 2022-04-14 NOTE — REVIEW OF SYSTEMS
[Constipation] : constipation [Heartburn] : heartburn [No erections] : no erections [Urine Infection (bladder/kidney)] : bladder/kidney infection [Wake up at night to urinate  How many times?  ___] : wakes up to urinate [unfilled] times during the night [Strain or push to urinate] : strain or push to urinate [Interrupted urine stream] : interrupted urine stream [Feelings Of Weakness] : feelings of weakness [Negative] : Heme/Lymph

## 2022-04-19 NOTE — ADDENDUM
[FreeTextEntry1] : I, Bianca Obando, acted solely as a scribe for Dr. Josh Ceballos on this date 04/14/2022.\par \par All medical record entries made by the Scribe were at my, Dr. Josh Ceballos, direction and personally dictated by me on 04/14/2022. I have reviewed the chart and agree that the record accurately reflects my personal performance of the history, physical exam, assessment and plan. I have also personally directed, reviewed and agreed with the chart.

## 2022-04-19 NOTE — HISTORY OF PRESENT ILLNESS
[FreeTextEntry1] : Mr. Eason is a 70 y/o male presents today for a second opinion for elevated PSA. His PSA in July was 7.5, but his most recent PSA was 10.38 in January 10th 2022. On 1/10/22 his creatinine was 1.01 and his testosterone was 523.2, and his alkaline phosphatase was 63. In 1999, he had chemotherapy for gastric carcinoma at [facility], which is where he had radiation for prostate cancer. Pt also complains of erectile dysfunction. He had subtotal gastrectomy and hx of lung abscesses. His urologist, Dr. Chris Zhou, told the patient that he has prostate cancer, but it is stable and but recommended the patient see oncology. Pt had radiation therapy for prostate cancer 15 years ago. Pt had spinal cord surgery 3 weeks ago and has been feeling much better. He sees a hematologist because he has elevated WBC. Pt reports he takes a supplement, Azo D-Mannose for urinary tract health. Nocturia only twice per night, previously it was 5-6 times per night. Pt had recent bone scan that showed a small spot on the right 5th rib. Pt reports he has a stricture dilated with his urologist every 3 months. No pacemaker. He reports his father had adenoma of the prostate. Pt smoked a pack a day for 20 years ago, but quit many years ago.

## 2022-04-19 NOTE — LETTER BODY
[Dear  ___] : Dear  [unfilled], [Courtesy Letter:] : I had the pleasure of seeing your patient, [unfilled], in my office today. [Please see my note below.] : Please see my note below. [Consult Closing:] : Thank you very much for allowing me to participate in the care of this patient.  If you have any questions, please do not hesitate to contact me. [Sincerely,] : Sincerely, [FreeTextEntry2] : Dr. Macie Sanders\par [id number]\par [place] \par [address] [FreeTextEntry3] : Dr. Josh Ceballos MD, PhD

## 2022-04-19 NOTE — PHYSICAL EXAM
[General Appearance - Well Developed] : well developed [General Appearance - Well Nourished] : well nourished [Normal Appearance] : normal appearance [Well Groomed] : well groomed [General Appearance - In No Acute Distress] : no acute distress [Edema] : no peripheral edema [Respiration, Rhythm And Depth] : normal respiratory rhythm and effort [Exaggerated Use Of Accessory Muscles For Inspiration] : no accessory muscle use [Abdomen Soft] : soft [Abdomen Tenderness] : non-tender [Normal Station and Gait] : the gait and station were normal for the patient's age [] : no rash [No Focal Deficits] : no focal deficits [Oriented To Time, Place, And Person] : oriented to person, place, and time [Mood] : the mood was normal [Affect] : the affect was normal [Not Anxious] : not anxious [FreeTextEntry1] : Digital rectal exam found no suspicious rectal masses. Anal tone is normal. The prostate is non tender, with normal texture, discrete borders, and no nodules. It is an 15g transurethral resection size. There were no rectal mucosal lesions. There was no gross blood on the exam finger. No rectal polyps.

## 2022-04-19 NOTE — ASSESSMENT
[FreeTextEntry1] : Mr. Eason is a 72 y/o male presents today for a second opinion for elevated PSA. His PSA in July was 7.5, but his most recent PSA was 10.38 in January 10th 2022. On 1/10/22 his creatinine was 1.01 and his testosterone was 523.2, and his alkaline phosphatase was 63. In 1999, he had chemotherapy for gastric carcinoma at [facility], which is where he had radiation for prostate cancer. Pt also complains of erectile dysfunction. He had subtotal gastrectomy and hx of lung abscesses. His urologist, Dr. Chris Zhou, told the patient that he has prostate cancer, but it is stable and but recommended the patient see oncology. Pt had radiation therapy for prostate cancer 15 years ago. Pt had spinal cord surgery 3 weeks ago and has been feeling much better. He sees a hematologist because he has elevated WBC. Pt reports he takes a supplement, Azo D-Mannose for urinary tract health. Nocturia only twice per night, previously it was 5-6 times per night. Pt had recent bone scan that showed a small spot on the right 5th rib. Pt reports he has a stricture dilated with his urologist every 3 months. No pacemaker. He reports his father had adenoma of the prostate. Pt smoked a pack a day for 20 years ago, but quit many years ago. \par \par Digital rectal exam found no suspicious rectal masses. Anal tone is normal. The prostate is non tender, with normal texture, discrete borders, and no nodules. It is an 15g transurethral resection size. There were no rectal mucosal lesions. There was no gross blood on the exam finger. No rectal polyps. \par \par Blood work today included BMP, CBC, alkaline phosphatase, PSA, and testosterone. \par The patient produced a urine sample which will be sent for urinalysis, urine cytology, and urine culture.\par \par The patient will go for an MRI of the prostate. He will also go for a CT of the chest to evaluate the spot on his rib. I advised the patient against radiation therapy because he has already had prostate radiation in the past. \par \par RTO 1-2 weeks after his MRI and CT. \par \par Preparation, in-person office visit, and coordination of care took: 45 minutes.

## 2022-04-20 LAB
ALBUMIN SERPL ELPH-MCNC: 4.6 G/DL
ALP BLD-CCNC: 59 U/L
ALT SERPL-CCNC: 16 U/L
ANION GAP SERPL CALC-SCNC: 12 MMOL/L
APPEARANCE: CLEAR
AST SERPL-CCNC: 16 U/L
BACTERIA UR CULT: NORMAL
BACTERIA: NEGATIVE
BASOPHILS # BLD AUTO: 0 K/UL
BASOPHILS NFR BLD AUTO: 0 %
BILIRUB SERPL-MCNC: 0.4 MG/DL
BILIRUBIN URINE: NEGATIVE
BLOOD URINE: NEGATIVE
BUN SERPL-MCNC: 17 MG/DL
CALCIUM SERPL-MCNC: 9.7 MG/DL
CHLORIDE SERPL-SCNC: 106 MMOL/L
CO2 SERPL-SCNC: 26 MMOL/L
COLOR: COLORLESS
CREAT SERPL-MCNC: 0.9 MG/DL
EGFR: 91 ML/MIN/1.73M2
EOSINOPHIL # BLD AUTO: 0.13 K/UL
EOSINOPHIL NFR BLD AUTO: 0.9 %
GLUCOSE QUALITATIVE U: NEGATIVE
GLUCOSE SERPL-MCNC: 65 MG/DL
HCT VFR BLD CALC: 39.1 %
HGB BLD-MCNC: 11.9 G/DL
HYALINE CASTS: 0 /LPF
KETONES URINE: NEGATIVE
LEUKOCYTE ESTERASE URINE: NEGATIVE
LYMPHOCYTES # BLD AUTO: 10.21 K/UL
LYMPHOCYTES NFR BLD AUTO: 69.4 %
MAN DIFF?: NORMAL
MCHC RBC-ENTMCNC: 29.5 PG
MCHC RBC-ENTMCNC: 30.4 GM/DL
MCV RBC AUTO: 96.8 FL
MICROSCOPIC-UA: NORMAL
MONOCYTES # BLD AUTO: 0.53 K/UL
MONOCYTES NFR BLD AUTO: 3.6 %
NEUTROPHILS # BLD AUTO: 3.44 K/UL
NEUTROPHILS NFR BLD AUTO: 23.4 %
NITRITE URINE: NEGATIVE
PH URINE: 6
PLATELET # BLD AUTO: 229 K/UL
POTASSIUM SERPL-SCNC: 3.8 MMOL/L
PROT SERPL-MCNC: 6.6 G/DL
PROTEIN URINE: NEGATIVE
PSA SERPL-MCNC: 10.6 NG/ML
RBC # BLD: 4.04 M/UL
RBC # FLD: 13.8 %
RED BLOOD CELLS URINE: 0 /HPF
SODIUM SERPL-SCNC: 144 MMOL/L
SPECIFIC GRAVITY URINE: 1
SQUAMOUS EPITHELIAL CELLS: 0 /HPF
TESTOST SERPL-MCNC: 382 NG/DL
URINE CYTOLOGY: NORMAL
UROBILINOGEN URINE: NORMAL
WBC # FLD AUTO: 14.71 K/UL
WHITE BLOOD CELLS URINE: 0 /HPF

## 2022-04-25 ENCOUNTER — RESULT REVIEW (OUTPATIENT)
Age: 72
End: 2022-04-25

## 2022-04-25 ENCOUNTER — OUTPATIENT (OUTPATIENT)
Dept: OUTPATIENT SERVICES | Facility: HOSPITAL | Age: 72
LOS: 1 days | End: 2022-04-25
Payer: MEDICARE

## 2022-04-25 ENCOUNTER — APPOINTMENT (OUTPATIENT)
Dept: MRI IMAGING | Facility: IMAGING CENTER | Age: 72
End: 2022-04-25
Payer: MEDICARE

## 2022-04-25 ENCOUNTER — APPOINTMENT (OUTPATIENT)
Dept: CT IMAGING | Facility: IMAGING CENTER | Age: 72
End: 2022-04-25
Payer: MEDICARE

## 2022-04-25 DIAGNOSIS — Z90.49 ACQUIRED ABSENCE OF OTHER SPECIFIED PARTS OF DIGESTIVE TRACT: Chronic | ICD-10-CM

## 2022-04-25 DIAGNOSIS — Z98.890 OTHER SPECIFIED POSTPROCEDURAL STATES: Chronic | ICD-10-CM

## 2022-04-25 DIAGNOSIS — C61 MALIGNANT NEOPLASM OF PROSTATE: ICD-10-CM

## 2022-04-25 DIAGNOSIS — Z92.3 PERSONAL HISTORY OF IRRADIATION: Chronic | ICD-10-CM

## 2022-04-25 DIAGNOSIS — Z90.3 ACQUIRED ABSENCE OF STOMACH [PART OF]: Chronic | ICD-10-CM

## 2022-04-25 DIAGNOSIS — Z00.8 ENCOUNTER FOR OTHER GENERAL EXAMINATION: ICD-10-CM

## 2022-04-25 PROCEDURE — 72197 MRI PELVIS W/O & W/DYE: CPT | Mod: 26

## 2022-04-25 PROCEDURE — A9585: CPT

## 2022-04-25 PROCEDURE — 71250 CT THORAX DX C-: CPT

## 2022-04-25 PROCEDURE — 76498 UNLISTED MR PROCEDURE: CPT

## 2022-04-25 PROCEDURE — 76498P: CUSTOM | Mod: 26

## 2022-04-25 PROCEDURE — 71250 CT THORAX DX C-: CPT | Mod: 26

## 2022-04-25 PROCEDURE — 72197 MRI PELVIS W/O & W/DYE: CPT

## 2022-04-26 ENCOUNTER — APPOINTMENT (OUTPATIENT)
Dept: NEUROSURGERY | Facility: CLINIC | Age: 72
End: 2022-04-26
Payer: MEDICARE

## 2022-04-26 VITALS
OXYGEN SATURATION: 98 % | HEART RATE: 57 BPM | BODY MASS INDEX: 18.07 KG/M2 | WEIGHT: 122 LBS | HEIGHT: 69 IN | DIASTOLIC BLOOD PRESSURE: 76 MMHG | SYSTOLIC BLOOD PRESSURE: 117 MMHG

## 2022-04-26 DIAGNOSIS — M51.27 OTHER INTERVERTEBRAL DISC DISPLACEMENT, LUMBOSACRAL REGION: ICD-10-CM

## 2022-04-26 PROCEDURE — 99024 POSTOP FOLLOW-UP VISIT: CPT

## 2022-04-28 PROBLEM — M51.27 HERNIATED NUCLEUS PULPOSUS, L5-S1: Status: ACTIVE | Noted: 2022-03-30

## 2022-05-02 ENCOUNTER — NON-APPOINTMENT (OUTPATIENT)
Age: 72
End: 2022-05-02

## 2022-05-09 NOTE — REASON FOR VISIT
Post-Anesthesia Evaluation and Assessment    Patient: Nacho Sanders MRN: [id number]  SSN: [id number]    YOB: 1964  Age: 48 y.o. Sex: female       Cardiovascular Function/Vital Signs  Visit Vitals    /81 (BP 1 Location: Right arm, BP Patient Position: At rest;Supine; Head of bed elevated (Comment degrees))    Pulse 81    Temp 37 °C (98.6 °F)    Resp (!) 104    Ht 5' 4\" (1.626 m)    Wt 63.6 kg (140 lb 3.4 oz)    SpO2 100%    BMI 24.07 kg/m2       Patient is status post general anesthesia for Procedure(s):  C3-C4 C5-C6 ACDF WITH INSTRUMENTATION AND REMOVAL. Nausea/Vomiting: None    Postoperative hydration reviewed and adequate. Pain:  Pain Scale 1: Numeric (0 - 10) (08/29/17 1443)  Pain Intensity 1: 3 (08/29/17 1443)   Managed    Neurological Status:   Neuro (WDL): Exceptions to WDL (08/29/17 1443)  Neuro  Neurologic State: Drowsy; Eyes open spontaneously; Eyes open to voice (08/29/17 1443)  Orientation Level: Oriented X4 (08/29/17 1443)  Cognition: Follows commands (08/29/17 1443)  Speech: Clear (08/29/17 1443)  Assessment L Pupil: Brisk;Round (08/29/17 1443)  Size L Pupil (mm): 3 (08/29/17 1443)  Assessment R Pupil: Brisk;Round (08/29/17 1443)  Size R Pupil (mm): 3 (08/29/17 1443)  LUE Motor Response: Purposeful (08/29/17 1443)  LLE Motor Response: Purposeful (08/29/17 1443)  RUE Motor Response: Nonpurposeful (08/29/17 1317)  RLE Motor Response: Nonpurposeful (08/29/17 1317)   At baseline    Mental Status and Level of Consciousness: Arousable    Pulmonary Status:   O2 Device: Nasal cannula (08/29/17 1443)   Adequate oxygenation and airway patent    Complications related to anesthesia: None    Post-anesthesia assessment completed.  No concerns    Signed By: Jay Hernandes MD     August 29, 2017 [FreeTextEntry1] : Incision check patient request

## 2022-05-09 NOTE — PHYSICAL EXAM
[General Appearance - Alert] : alert [General Appearance - In No Acute Distress] : in no acute distress [Clean] : clean [Dry] : dry [Well-Healed] : well-healed [Intact] : intact [No Drainage] : without drainage [Normal Skin] : normal [Oriented To Time, Place, And Person] : oriented to person, place, and time [Impaired Insight] : insight and judgment were intact [5] : S1 toe walking 5/5 [Normal] : normal [Able to toe walk] : the patient was able to toe walk [Able to heel walk] : the patient was able to heel walk [Sclera] : the sclera and conjunctiva were normal [PERRL With Normal Accommodation] : pupils were equal in size, round, reactive to light, with normal accommodation [Hearing Threshold Finger Rub Not Graham] : hearing was normal [Neck Appearance] : the appearance of the neck was normal [] : no respiratory distress [Respiration, Rhythm And Depth] : normal respiratory rhythm and effort [Edema] : there was no peripheral edema [Abnormal Walk] : normal gait [Involuntary Movements] : no involuntary movements were seen [Motor Tone] : muscle strength and tone were normal [Skin Color & Pigmentation] : normal skin color and pigmentation [Erythema] : not erythematous [Warm] : not warm [Romberg's Sign] : Romberg's sign was negtive [Limited Balance] : balance was intact [Tremor] : no tremor present

## 2022-05-09 NOTE — HISTORY OF PRESENT ILLNESS
[FreeTextEntry1] : 70 yo male with PMH of B cell lymphoma, gastric Ca, Prostate Ca, chronic low back pain and RLE pain with onset of 10/10/2020.  He was found to have a large L5/S1 herniated disc on MRI sent by Dr. Mejia.  Pain management  with Dr. Saha, including injections, did not provide relief - see below\par \par TODAY, he arrives for an incision check at his request.  Doing well, denies pain, just wants us to have a look, no edema, warmth, or drainage.\par \par 3/29/22:  he arrives for an initial post op visit s/p 3/18/22 L5-S1 microdiskectomy.  He is feeling well, denies pain, and is very pleased.  The surgical incision is well healed, no signs of irritation, erythema, edema, warmth, or drainage.\par \par 1/12/2021:  He reports that he does not have any pain over the last 2 weeks.  He reports having ESCI in November with no relief at that time.   \par \par Pain:  \par Low back - denies back pain (mostly RLE pain)\par RLE from hip to toes\par Pain rated 10/10\par Aggravated with prolonged sitting, standing, walking\par Pain management:  Dr. Davis - has included 2 REMINGTON, and PT with no relief\par Pain management:  Dr. Saha has included injections with no relief\par Pain medications:  currently Gabapentin 300 mg 1 tab TID, Motrin, Tylenol, and a muscle relaxer \par \par

## 2022-05-09 NOTE — ASSESSMENT
[FreeTextEntry1] : 72 yo male s/p 3/18/22 L5-S1 microdiscectomy who has resolution of pre op severe pain and is very pleased.  The surgical incision is well healed with no signs of irritation, erythema, edema, or drainage.  Reviewed standard post op wound care instructions and light activity restrictions limiting BLT  from 0-12 weeks up to 25 lbs.    \par \par 1)  RTO 6 months - no imaging requested

## 2022-05-16 ENCOUNTER — APPOINTMENT (OUTPATIENT)
Dept: NUCLEAR MEDICINE | Facility: IMAGING CENTER | Age: 72
End: 2022-05-16
Payer: MEDICARE

## 2022-05-16 ENCOUNTER — OUTPATIENT (OUTPATIENT)
Dept: OUTPATIENT SERVICES | Facility: HOSPITAL | Age: 72
LOS: 1 days | End: 2022-05-16
Payer: MEDICARE

## 2022-05-16 ENCOUNTER — RESULT REVIEW (OUTPATIENT)
Age: 72
End: 2022-05-16

## 2022-05-16 DIAGNOSIS — C61 MALIGNANT NEOPLASM OF PROSTATE: ICD-10-CM

## 2022-05-16 DIAGNOSIS — Z98.890 OTHER SPECIFIED POSTPROCEDURAL STATES: Chronic | ICD-10-CM

## 2022-05-16 DIAGNOSIS — Z00.8 ENCOUNTER FOR OTHER GENERAL EXAMINATION: ICD-10-CM

## 2022-05-16 DIAGNOSIS — Z90.3 ACQUIRED ABSENCE OF STOMACH [PART OF]: Chronic | ICD-10-CM

## 2022-05-16 DIAGNOSIS — Z90.49 ACQUIRED ABSENCE OF OTHER SPECIFIED PARTS OF DIGESTIVE TRACT: Chronic | ICD-10-CM

## 2022-05-16 DIAGNOSIS — Z92.3 PERSONAL HISTORY OF IRRADIATION: Chronic | ICD-10-CM

## 2022-05-16 PROCEDURE — 78830 RP LOCLZJ TUM SPECT W/CT 1: CPT | Mod: 26

## 2022-05-16 PROCEDURE — 78830 RP LOCLZJ TUM SPECT W/CT 1: CPT

## 2022-05-16 PROCEDURE — 78306 BONE IMAGING WHOLE BODY: CPT | Mod: 26

## 2022-05-16 PROCEDURE — A9561: CPT

## 2022-05-16 PROCEDURE — 78306 BONE IMAGING WHOLE BODY: CPT

## 2022-06-02 ENCOUNTER — APPOINTMENT (OUTPATIENT)
Dept: UROLOGY | Facility: CLINIC | Age: 72
End: 2022-06-02
Payer: MEDICARE

## 2022-06-02 PROCEDURE — 99214 OFFICE O/P EST MOD 30 MIN: CPT

## 2022-06-18 NOTE — HISTORY OF PRESENT ILLNESS
[FreeTextEntry1] : 4/14/2022: Mr. Eason is a 70 y/o male presents today for a second opinion for elevated PSA. His PSA in July was 7.5, but his most recent PSA was 10.38 in January 10th 2022. On 1/10/22 his creatinine was 1.01 and his testosterone was 523.2, and his alkaline phosphatase was 63. In 1999, he had chemotherapy for gastric carcinoma at [facility], which is where he had radiation for prostate cancer. Pt also complains of erectile dysfunction. He had subtotal gastrectomy and hx of lung abscesses. His urologist, Dr. Chris Zhou, told the patient that he has prostate cancer, but it is stable and but recommended the patient see oncology. Pt had radiation therapy for prostate cancer 15 years ago. Pt had spinal cord surgery 3 weeks ago and has been feeling much better. He sees a hematologist because he has elevated WBC. Pt reports he takes a supplement, Azo D-Mannose for urinary tract health. Nocturia only twice per night, previously it was 5-6 times per night. Pt had recent bone scan that showed a small spot on the right 5th rib. Pt reports he has a stricture dilated with his urologist every 3 months. No pacemaker. He reports his father had adenoma of the prostate. Pt smoked a pack a day for 20 years ago, but quit many years ago. \par \par 06/02/2022: Patient presents today for a follow up. Patient had a CT of the chest on 4/25/2022 which demonstrated biapical opacities secondary to prior inflammation/ infection. A sclerotic lesion within the posterior fifth rib was indeterminate. A sclerotic lesion in the posterior left seventh rib is indeterminate. Prostate MRI obtained 4/25/2022 revealed right and left, entire transverse plan, base to apex, peripheral zone lesion. Extracapsular extension, neurovascular bundle abutment, and bilateral seminal vesicle invasion. PIRADS-5. Bilateral pelvic lymphadenopathy. Prostate volume was 35 cc and PSA density was 0.30 ng/mL/cc. Subsequently I sent him for a bone scan which he obtained on 5/16/2022. NM bone scan demonstrated mildly increased uptake corresponding to sclerotic lesion in the right posterior 5th rib, non specific. Degenerative disease in the spine and major joints. No clear evidence of bone metastases. PSA of 4/14/2022 was 10.60. \par

## 2022-06-18 NOTE — ADDENDUM
[FreeTextEntry1] : This note was authored by Meg Arellano working as a scribe for Dr.Gary Ceballos. I, Dr. Josh Ceballos have reviewed the content of this note and confirm it is true and accurate. I personally performed the history and physical examination and made all the decisions 06/02/2022.

## 2022-06-18 NOTE — ASSESSMENT
[FreeTextEntry1] : 4/14/2022: Mr. Eason is a 70 y/o male presents today for a second opinion for elevated PSA. His PSA in July was 7.5, but his most recent PSA was 10.38 in January 10th 2022. On 1/10/22 his creatinine was 1.01 and his testosterone was 523.2, and his alkaline phosphatase was 63. In 1999, he had chemotherapy for gastric carcinoma at [facility], which is where he had radiation for prostate cancer. Pt also complains of erectile dysfunction. He had subtotal gastrectomy and hx of lung abscesses. His urologist, Dr. Chris Zhou, told the patient that he has prostate cancer, but it is stable and but recommended the patient see oncology. Pt had radiation therapy for prostate cancer 15 years ago. Pt had spinal cord surgery 3 weeks ago and has been feeling much better. He sees a hematologist because he has elevated WBC. Pt reports he takes a supplement, Azo D-Mannose for urinary tract health. Nocturia only twice per night, previously it was 5-6 times per night. Pt had recent bone scan that showed a small spot on the right 5th rib. Pt reports he has a stricture dilated with his urologist every 3 months. No pacemaker. He reports his father had adenoma of the prostate. Pt smoked a pack a day for 20 years ago, but quit many years ago. \par \par Digital rectal exam found no suspicious rectal masses. Anal tone is normal. The prostate is non tender, with normal texture, discrete borders, and no nodules. It is an 15g transurethral resection size. There were no rectal mucosal lesions. There was no gross blood on the exam finger. No rectal polyps. \par Blood work today included BMP, CBC, alkaline phosphatase, PSA, and testosterone. \par The patient produced a urine sample which will be sent for urinalysis, urine cytology, and urine culture.\par The patient will go for an MRI of the prostate. He will also go for a CT of the chest to evaluate the spot on his rib. I advised the patient against radiation therapy because he has already had prostate radiation in the past. \par RTO 1-2 weeks after his MRI and CT. \par \par 06/02/2022: Patient presents today for a follow up. Patient had a CT of the chest on 4/25/2022 which demonstrated biapical opacities secondary to prior inflammation/ infection. A sclerotic lesion within the posterior fifth rib was indeterminate. A sclerotic lesion in the posterior left seventh rib is indeterminate. Prostate MRI obtained 4/25/2022 revealed right and left, entire transverse plan, base to apex, peripheral zone lesion. Extracapsular extension, neurovascular bundle abutment, and bilateral seminal vesicle invasion. PIRADS-5. Bilateral pelvic lymphadenopathy. Prostate volume was 35 cc and PSA density was 0.30 ng/mL/cc. Subsequently I sent him for a bone scan which he obtained on 5/16/2022. NM bone scan demonstrated mildly increased uptake corresponding to sclerotic lesion in the right posterior 5th rib, non specific. Degenerative disease in the spine and major joints. No clear evidence of bone metastases. PSA of 4/14/2022 was 10.60. \par \par Clinical findings, CT, MRI, and NM bone scan results were reviewed at length with the patient and his wife. As the patient has a hx of prostate cancer treated with radiation, I believe the patient is a good candidate for androgen deprivation hormone therapy and that was my recommendation at this time. I informed him that observation is also a reasonable option. I also informed him that cryosurgery is an aggressive option, however I am advising against it. \par \par I am sending the patient for a DEXA bone density scan. I anticipate giving the patient Prolia injections twice a year. Patient states he is in the process of getting dental implants. Post has been in for two months. Pt was instructed to talk to his dentist and ask if it was okay if he gets a Prolia injection. \par \par The patient wanted to consider his options and continue with observation at this time and reassess in 3 months time which I was agreeable with. The patient's wife inquired if the patient was okay to travel on vacation and drink alcohol. I informed her that it is perfectly okay for him to go on vacation and to not exceed 1 shot of vodka or 2 6 oz glasses of wine a day. \par \par Preparation, in person office visit, and coordination of care: 40 minutes.

## 2022-09-02 ENCOUNTER — APPOINTMENT (OUTPATIENT)
Dept: UROLOGY | Facility: CLINIC | Age: 72
End: 2022-09-02

## 2022-09-22 ENCOUNTER — APPOINTMENT (OUTPATIENT)
Dept: UROLOGY | Facility: CLINIC | Age: 72
End: 2022-09-22

## 2022-09-22 DIAGNOSIS — C61 MALIGNANT NEOPLASM OF PROSTATE: ICD-10-CM

## 2022-09-22 DIAGNOSIS — M89.9 DISORDER OF BONE, UNSPECIFIED: ICD-10-CM

## 2022-09-22 DIAGNOSIS — Z91.89 OTHER SPECIFIED PERSONAL RISK FACTORS, NOT ELSEWHERE CLASSIFIED: ICD-10-CM

## 2022-09-22 DIAGNOSIS — D72.820 LYMPHOCYTOSIS (SYMPTOMATIC): ICD-10-CM

## 2022-09-22 PROCEDURE — 99214 OFFICE O/P EST MOD 30 MIN: CPT | Mod: 95

## 2022-09-22 NOTE — ADDENDUM
[FreeTextEntry1] : This note was authored by Meg Arellano working as a scribe for Dr.Gary Ceballos. I, Dr. Josh Ceballos have reviewed the content of this note and confirm it is true and accurate. I personally performed the history and physical examination and made all the decisions 09/22/2022

## 2022-09-22 NOTE — REVIEW OF SYSTEMS
[Heartburn] : heartburn [No erections] : no erections [Wake up at night to urinate  How many times?  ___] : wakes up to urinate [unfilled] times during the night [Strain or push to urinate] : strain or push to urinate [Feelings Of Weakness] : feelings of weakness [Negative] : Heme/Lymph [Nocturia] : nocturia

## 2022-09-22 NOTE — ASSESSMENT
[FreeTextEntry1] : 4/14/2022: Mr. Eason is a 71 y/o male presents today for a second opinion for elevated PSA. His PSA in July was 7.5, but his most recent PSA was 10.38 in January 10th 2022. On 1/10/22 his creatinine was 1.01 and his testosterone was 523.2, and his alkaline phosphatase was 63. In 1999, he had chemotherapy for gastric carcinoma at [facility], which is where he had radiation for prostate cancer. Pt also complains of erectile dysfunction. He had subtotal gastrectomy and hx of lung abscesses. His urologist, Dr. Chris Zhou, told the patient that he has prostate cancer, but it is stable and but recommended the patient see oncology. Pt had radiation therapy for prostate cancer 15 years ago. Pt had spinal cord surgery 3 weeks ago and has been feeling much better. He sees a hematologist because he has elevated WBC. Pt reports he takes a supplement, Azo D-Mannose for urinary tract health. Nocturia only twice per night, previously it was 5-6 times per night. Pt had recent bone scan that showed a small spot on the right 5th rib. Pt reports he has a stricture dilated with his urologist every 3 months. No pacemaker. He reports his father had adenoma of the prostate. Pt smoked a pack a day for 20 years ago, but quit many years ago. \par \par Digital rectal exam found no suspicious rectal masses. Anal tone is normal. The prostate is non tender, with normal texture, discrete borders, and no nodules. It is an 15g transurethral resection size. There were no rectal mucosal lesions. There was no gross blood on the exam finger. No rectal polyps. \par Blood work today included BMP, CBC, alkaline phosphatase, PSA, and testosterone. \par The patient produced a urine sample which will be sent for urinalysis, urine cytology, and urine culture.\par The patient will go for an MRI of the prostate. He will also go for a CT of the chest to evaluate the spot on his rib. I advised the patient against radiation therapy because he has already had prostate radiation in the past. \par RTO 1-2 weeks after his MRI and CT. \par \par 06/02/2022: Patient presents today for a follow up. Patient had a CT of the chest on 4/25/2022 which demonstrated biapical opacities secondary to prior inflammation/ infection. A sclerotic lesion within the posterior fifth rib was indeterminate. A sclerotic lesion in the posterior left seventh rib is indeterminate. Prostate MRI obtained 4/25/2022 revealed right and left, entire transverse plan, base to apex, peripheral zone lesion. Extracapsular extension, neurovascular bundle abutment, and bilateral seminal vesicle invasion. PIRADS-5. Bilateral pelvic lymphadenopathy. Prostate volume was 35 cc and PSA density was 0.30 ng/mL/cc. Subsequently I sent him for a bone scan which he obtained on 5/16/2022. NM bone scan demonstrated mildly increased uptake corresponding to sclerotic lesion in the right posterior 5th rib, non specific. Degenerative disease in the spine and major joints. No clear evidence of bone metastases. PSA of 4/14/2022 was 10.60. \par \par Clinical findings, CT, MRI, and NM bone scan results were reviewed at length with the patient and his wife. As the patient has a hx of prostate cancer treated with radiation, I believe the patient is a good candidate for androgen deprivation hormone therapy and that was my recommendation at this time. I informed him that observation is also a reasonable option. I also informed him that cryosurgery is an aggressive option, however I am advising against it. \par I am sending the patient for a DEXA bone density scan. I anticipate giving the patient Prolia injections twice a year. Patient states he is in the process of getting dental implants. Post has been in for two months. Pt was instructed to talk to his dentist and ask if it was okay if he gets a Prolia injection. \par The patient wanted to consider his options and continue with observation at this time and reassess in 3 months time which I was agreeable with. The patient's wife inquired if the patient was okay to travel on vacation and drink alcohol. I informed her that it is perfectly okay for him to go on vacation and to not exceed 1 shot of vodka or 2 6 oz glasses of wine a day. \par \par 09/22/2022:  presents today for a follow up telehealth visit. He was contacted late at 10:04 AM and was unable to be reached on his cell phone or through [employer]. I was able to reach him at his wife's cell phone number and he requested that we use [employer]. I informed him of the security risks with [employer] and that [employer] is more secure but patient preferred [employer] despite this, therefore a face time call was conducted. The patient reports he is feeling well. He does currently have a cold. He tested himself for covid and was negative. He reports nocturia x1-2. Denies urinary urgency, leakage, burning, and gross hematuria. If he holds his urine for a period of time, he has to push and strain a little bit. Denies pain in his lower abdomen, back, and flank. BM are regular. \par \par We discussed the possibility of instituting hormone therapy for prostate cancer. At the time the patient is doing well and would like to continue with observation which I was agreeable with. He will go to his nearest [facility] lab for laboratory blood work. \par \par Patient will have a telehealth visit after obtaining laboratory work to review and discuss results. \par \par Preparation, audio-visual visit, and coordination of care took : 30 minutes.

## 2022-09-22 NOTE — HISTORY OF PRESENT ILLNESS
[FreeTextEntry1] : 4/14/2022: Mr. Eason is a 73 y/o male presents today for a second opinion for elevated PSA. His PSA in July was 7.5, but his most recent PSA was 10.38 in January 10th 2022. On 1/10/22 his creatinine was 1.01 and his testosterone was 523.2, and his alkaline phosphatase was 63. In 1999, he had chemotherapy for gastric carcinoma at [facility], which is where he had radiation for prostate cancer. Pt also complains of erectile dysfunction. He had subtotal gastrectomy and hx of lung abscesses. His urologist, Dr. Chris Zhou, told the patient that he has prostate cancer, but it is stable and but recommended the patient see oncology. Pt had radiation therapy for prostate cancer 15 years ago. Pt had spinal cord surgery 3 weeks ago and has been feeling much better. He sees a hematologist because he has elevated WBC. Pt reports he takes a supplement, Azo D-Mannose for urinary tract health. Nocturia only twice per night, previously it was 5-6 times per night. Pt had recent bone scan that showed a small spot on the right 5th rib. Pt reports he has a stricture dilated with his urologist every 3 months. No pacemaker. He reports his father had adenoma of the prostate. Pt smoked a pack a day for 20 years ago, but quit many years ago. \par \par 06/02/2022: Patient presents today for a follow up. Patient had a CT of the chest on 4/25/2022 which demonstrated biapical opacities secondary to prior inflammation/ infection. A sclerotic lesion within the posterior fifth rib was indeterminate. A sclerotic lesion in the posterior left seventh rib is indeterminate. Prostate MRI obtained 4/25/2022 revealed right and left, entire transverse plan, base to apex, peripheral zone lesion. Extracapsular extension, neurovascular bundle abutment, and bilateral seminal vesicle invasion. PIRADS-5. Bilateral pelvic lymphadenopathy. Prostate volume was 35 cc and PSA density was 0.30 ng/mL/cc. Subsequently I sent him for a bone scan which he obtained on 5/16/2022. NM bone scan demonstrated mildly increased uptake corresponding to sclerotic lesion in the right posterior 5th rib, non specific. Degenerative disease in the spine and major joints. No clear evidence of bone metastases. PSA of 4/14/2022 was 10.60\par \par 09/22/2022:  presents today for a follow up telehealth visit. He was contacted late at 10:04 AM and was unable to be reached on his cell phone or through [employer]. I was able to reach him at his wife's cell phone number and he requested that we use [employer]. I informed him of the security risks with [employer] and that [employer] is more secure but patient preferred [employer] despite this, therefore a face time call was conducted. The patient reports he is feeling well. He does currently have a cold. He tested himself for covid and was negative. He reports nocturia x1-2. Denies urinary urgency, leakage, burning, and gross hematuria. If he holds his urine for a period of time, he has to push and strain a little bit. Denies pain in his lower abdomen, back, and flank. BM are regular.

## 2022-10-03 ENCOUNTER — APPOINTMENT (OUTPATIENT)
Dept: SPINE | Facility: CLINIC | Age: 72
End: 2022-10-03

## 2022-10-03 VITALS
WEIGHT: 135 LBS | SYSTOLIC BLOOD PRESSURE: 115 MMHG | OXYGEN SATURATION: 97 % | HEIGHT: 69 IN | BODY MASS INDEX: 19.99 KG/M2 | DIASTOLIC BLOOD PRESSURE: 71 MMHG | HEART RATE: 75 BPM

## 2022-10-03 DIAGNOSIS — Z98.890 OTHER SPECIFIED POSTPROCEDURAL STATES: ICD-10-CM

## 2022-10-03 PROCEDURE — 99213 OFFICE O/P EST LOW 20 MIN: CPT

## 2022-10-03 NOTE — PHYSICAL EXAM
[Motor Tone] : muscle tone was normal in all four extremities [Sensation Tactile Decrease] : light touch was intact [Abnormal Walk] : normal gait

## 2022-10-04 ENCOUNTER — APPOINTMENT (OUTPATIENT)
Dept: NEUROSURGERY | Facility: CLINIC | Age: 72
End: 2022-10-04

## 2022-10-04 NOTE — HISTORY OF PRESENT ILLNESS
[FreeTextEntry1] : 72 yo male with PMH of B cell lymphoma, gastric Ca, Prostate Ca, chronic low back pain and RLE pain with onset of 10/10/2020. He was found to have a large L5/S1 herniated disc on MRI sent by Dr. Mejia. Pain management with Dr. Saha, including injections, did not provide relief.

## 2022-10-04 NOTE — ASSESSMENT
[FreeTextEntry1] : 72 year old male 6 months s/p L5-S1 microdiskectomy with Dr. Dinh. Doing well overall. RTO PRN.

## 2022-10-04 NOTE — REASON FOR VISIT
[Follow-Up: _____] : a [unfilled] follow-up visit [Other: _____] : [unfilled] [FreeTextEntry1] : Mr. Eason underwent right-sided minimally L5-S1 microdiskectomy with Dr. Dinh on 3/18/2022. Today he reports he is doing well. Preoperative pain has resolved.  Quite happy with the results of his surgery.

## 2024-01-26 NOTE — PATIENT PROFILE ADULT - NSTOBACCONEVERSMOKERY/N_GEN_A
Pt in clinic stating Toujeo insulin was $90 at [facility] and request RX sent to [facility] pharmacy. RX reordered and sent to [facility] pharmacy per pt request.     
Yes

## 2025-01-28 ENCOUNTER — APPOINTMENT (OUTPATIENT)
Dept: RADIATION ONCOLOGY | Facility: CLINIC | Age: 75
End: 2025-01-28

## 2025-01-28 ENCOUNTER — NON-APPOINTMENT (OUTPATIENT)
Age: 75
End: 2025-01-28

## 2025-01-28 VITALS — WEIGHT: 140 LBS | BODY MASS INDEX: 20.73 KG/M2 | HEIGHT: 69 IN | RESPIRATION RATE: 18 BRPM

## 2025-01-28 PROCEDURE — 99204 OFFICE O/P NEW MOD 45 MIN: CPT

## 2025-02-26 ENCOUNTER — APPOINTMENT (OUTPATIENT)
Dept: RADIATION ONCOLOGY | Facility: CLINIC | Age: 75
End: 2025-02-26
Payer: MEDICARE

## 2025-02-26 PROCEDURE — 99213 OFFICE O/P EST LOW 20 MIN: CPT | Mod: 93

## 2025-08-25 ENCOUNTER — APPOINTMENT (OUTPATIENT)
Dept: UROLOGY | Facility: CLINIC | Age: 75
End: 2025-08-25
Payer: MEDICARE

## 2025-08-25 VITALS
SYSTOLIC BLOOD PRESSURE: 133 MMHG | DIASTOLIC BLOOD PRESSURE: 77 MMHG | OXYGEN SATURATION: 95 % | HEART RATE: 83 BPM | WEIGHT: 136 LBS | BODY MASS INDEX: 20.14 KG/M2 | HEIGHT: 69 IN

## 2025-08-25 DIAGNOSIS — Z98.890 OTHER SPECIFIED POSTPROCEDURAL STATES: ICD-10-CM

## 2025-08-25 DIAGNOSIS — C61 MALIGNANT NEOPLASM OF PROSTATE: ICD-10-CM

## 2025-08-25 LAB
ANION GAP SERPL CALC-SCNC: 16 MMOL/L
BUN SERPL-MCNC: 14 MG/DL
CALCIUM SERPL-MCNC: 9.4 MG/DL
CHLORIDE SERPL-SCNC: 105 MMOL/L
CO2 SERPL-SCNC: 22 MMOL/L
CREAT SERPL-MCNC: 0.87 MG/DL
EGFRCR SERPLBLD CKD-EPI 2021: 90 ML/MIN/1.73M2
GLUCOSE SERPL-MCNC: 93 MG/DL
POTASSIUM SERPL-SCNC: 4.1 MMOL/L
PSA FREE FLD-MCNC: 4 %
PSA FREE SERPL-MCNC: 0.62 NG/ML
PSA SERPL-MCNC: 15 NG/ML
SODIUM SERPL-SCNC: 143 MMOL/L

## 2025-08-25 PROCEDURE — 51798 US URINE CAPACITY MEASURE: CPT

## 2025-08-25 PROCEDURE — G2211 COMPLEX E/M VISIT ADD ON: CPT

## 2025-08-25 PROCEDURE — 99214 OFFICE O/P EST MOD 30 MIN: CPT

## 2025-08-25 RX ORDER — SODIUM PHOSPHATE, DIBASIC AND SODIUM PHOSPHATE, MONOBASIC 7; 19 G/230ML; G/230ML
ENEMA RECTAL
Qty: 1 | Refills: 0 | Status: ACTIVE | COMMUNITY
Start: 2025-08-25 | End: 1900-01-01

## 2025-09-09 ENCOUNTER — APPOINTMENT (OUTPATIENT)
Dept: MRI IMAGING | Facility: CLINIC | Age: 75
End: 2025-09-09
Payer: MEDICARE

## 2025-09-09 ENCOUNTER — RESULT REVIEW (OUTPATIENT)
Age: 75
End: 2025-09-09

## 2025-09-09 PROCEDURE — 76498P: CUSTOM

## 2025-09-09 PROCEDURE — 72197 MRI PELVIS W/O & W/DYE: CPT

## 2025-09-09 PROCEDURE — A9585: CPT | Mod: JW

## 2025-09-15 ENCOUNTER — APPOINTMENT (OUTPATIENT)
Dept: UROLOGY | Facility: CLINIC | Age: 75
End: 2025-09-15
Payer: MEDICARE

## 2025-09-15 DIAGNOSIS — D72.820 LYMPHOCYTOSIS (SYMPTOMATIC): ICD-10-CM

## 2025-09-15 DIAGNOSIS — C61 MALIGNANT NEOPLASM OF PROSTATE: ICD-10-CM

## 2025-09-15 DIAGNOSIS — M54.16 RADICULOPATHY, LUMBAR REGION: ICD-10-CM

## 2025-09-15 DIAGNOSIS — Z91.89 OTHER SPECIFIED PERSONAL RISK FACTORS, NOT ELSEWHERE CLASSIFIED: ICD-10-CM

## 2025-09-15 DIAGNOSIS — M51.27 OTHER INTERVERTEBRAL DISC DISPLACEMENT, LUMBOSACRAL REGION: ICD-10-CM

## 2025-09-15 PROCEDURE — 99214 OFFICE O/P EST MOD 30 MIN: CPT

## 2025-09-15 PROCEDURE — G2211 COMPLEX E/M VISIT ADD ON: CPT

## 2025-09-15 RX ORDER — SODIUM PHOSPHATE, DIBASIC AND SODIUM PHOSPHATE, MONOBASIC 7; 19 G/230ML; G/230ML
ENEMA RECTAL
Qty: 1 | Refills: 0 | Status: ACTIVE | COMMUNITY
Start: 2025-09-15 | End: 1900-01-01

## (undated) DEVICE — GLV 7.5 PROTEXIS (WHITE)

## (undated) DEVICE — DRAPE GENERAL ENDOSCOPY

## (undated) DEVICE — GLV 8 PROTEXIS (WHITE)

## (undated) DEVICE — SUT MONOCRYL 4-0 27" PS-2 UNDYED

## (undated) DEVICE — PACK SPINE

## (undated) DEVICE — MIDAS REX LEGEND BALL FLUTED SM BORE 3.0MM X 10CM

## (undated) DEVICE — DRAPE INSTRUMENT POUCH 6.75" X 11"

## (undated) DEVICE — NDL SPINAL 18G X 3.5" (PINK)

## (undated) DEVICE — VAGINAL PACKING 2"

## (undated) DEVICE — SUT VICRYL 0 18" CT-2 (POP-OFF)

## (undated) DEVICE — MIDAS REX LEGEND LUBRICANT DIFFUSER CARTRIDGE

## (undated) DEVICE — MIDAS REX LEGEND BALL FLUTED SM BORE 4.0MM X 10CM

## (undated) DEVICE — VENODYNE/SCD SLEEVE CALF MEDIUM

## (undated) DEVICE — SUT VICRYL PLUS 2-0 18" CT-2 (POP-OFF)

## (undated) DEVICE — DRAPE C ARM 41X74"

## (undated) DEVICE — DRAPE MICROSCOPE LEICA MINI

## (undated) DEVICE — MIDAS REX LEGEND MATCH HEAD FLUTED SM BORE 3.0MM X 10CM

## (undated) DEVICE — SPONGE SURGICAL STRIP 1/4 X 6"

## (undated) DEVICE — ELCTR AQUAMANTYS BIPOLAR SEALER 6.0

## (undated) DEVICE — SPONGE SURGICAL STRIP 1/2 X 6"